# Patient Record
Sex: MALE | Race: WHITE
[De-identification: names, ages, dates, MRNs, and addresses within clinical notes are randomized per-mention and may not be internally consistent; named-entity substitution may affect disease eponyms.]

---

## 2019-07-29 ENCOUNTER — HOSPITAL ENCOUNTER (INPATIENT)
Dept: HOSPITAL 53 - M ED | Age: 39
LOS: 2 days | Discharge: HOME | DRG: 753 | End: 2019-07-31
Attending: PSYCHIATRY & NEUROLOGY | Admitting: PSYCHIATRY & NEUROLOGY
Payer: COMMERCIAL

## 2019-07-29 VITALS — HEIGHT: 71 IN | BODY MASS INDEX: 23.27 KG/M2 | WEIGHT: 166.23 LBS

## 2019-07-29 VITALS — DIASTOLIC BLOOD PRESSURE: 71 MMHG | SYSTOLIC BLOOD PRESSURE: 114 MMHG

## 2019-07-29 DIAGNOSIS — F12.20: ICD-10-CM

## 2019-07-29 DIAGNOSIS — F14.23: ICD-10-CM

## 2019-07-29 DIAGNOSIS — I10: ICD-10-CM

## 2019-07-29 DIAGNOSIS — Z88.0: ICD-10-CM

## 2019-07-29 DIAGNOSIS — F15.20: ICD-10-CM

## 2019-07-29 DIAGNOSIS — F11.11: ICD-10-CM

## 2019-07-29 DIAGNOSIS — F60.3: ICD-10-CM

## 2019-07-29 DIAGNOSIS — F10.20: ICD-10-CM

## 2019-07-29 DIAGNOSIS — F17.210: ICD-10-CM

## 2019-07-29 DIAGNOSIS — F31.9: Primary | ICD-10-CM

## 2019-07-29 DIAGNOSIS — K52.9: ICD-10-CM

## 2019-07-29 DIAGNOSIS — Z62.810: ICD-10-CM

## 2019-07-29 LAB
ALBUMIN SERPL BCG-MCNC: 4.1 GM/DL (ref 3.2–5.2)
ALT SERPL W P-5'-P-CCNC: 42 U/L (ref 12–78)
AMPHETAMINES UR QL SCN: NEGATIVE
APAP SERPL-MCNC: < 2 UG/ML (ref 10–30)
BARBITURATES UR QL SCN: NEGATIVE
BENZODIAZ UR QL SCN: NEGATIVE
BILIRUB CONJ SERPL-MCNC: 0.2 MG/DL (ref 0–0.2)
BILIRUB SERPL-MCNC: 0.5 MG/DL (ref 0.2–1)
BUN SERPL-MCNC: 7 MG/DL (ref 7–18)
BZE UR QL SCN: POSITIVE
CALCIUM SERPL-MCNC: 9.2 MG/DL (ref 8.5–10.1)
CANNABINOIDS UR QL SCN: NEGATIVE
CHLORIDE SERPL-SCNC: 105 MEQ/L (ref 98–107)
CO2 SERPL-SCNC: 29 MEQ/L (ref 21–32)
CREAT SERPL-MCNC: 1.14 MG/DL (ref 0.7–1.3)
ETHANOL SERPL-MCNC: 0 % (ref 0–0.01)
GFR SERPL CREATININE-BSD FRML MDRD: > 60 ML/MIN/{1.73_M2} (ref 60–?)
GLUCOSE SERPL-MCNC: 79 MG/DL (ref 70–100)
HCT VFR BLD AUTO: 47.7 % (ref 42–52)
HGB BLD-MCNC: 16.3 G/DL (ref 13.5–17.5)
MCH RBC QN AUTO: 31.5 PG (ref 27–33)
MCHC RBC AUTO-ENTMCNC: 34.2 G/DL (ref 32–36.5)
MCV RBC AUTO: 92.1 FL (ref 80–96)
METHADONE UR QL SCN: NEGATIVE
OPIATES UR QL SCN: NEGATIVE
PCP UR QL SCN: NEGATIVE
PLATELET # BLD AUTO: 218 10^3/UL (ref 150–450)
POTASSIUM SERPL-SCNC: 3.9 MEQ/L (ref 3.5–5.1)
PROT SERPL-MCNC: 8.6 GM/DL (ref 6.4–8.2)
RBC # BLD AUTO: 5.18 10^6/UL (ref 4.3–6.1)
SALICYLATES SERPL-MCNC: 4.5 MG/DL (ref 5–30)
SODIUM SERPL-SCNC: 140 MEQ/L (ref 136–145)
TSH SERPL DL<=0.005 MIU/L-ACNC: 2.05 UIU/ML (ref 0.36–3.74)
WBC # BLD AUTO: 6.3 10^3/UL (ref 4–10)

## 2019-07-30 VITALS — DIASTOLIC BLOOD PRESSURE: 68 MMHG | SYSTOLIC BLOOD PRESSURE: 125 MMHG

## 2019-07-30 VITALS — SYSTOLIC BLOOD PRESSURE: 119 MMHG | DIASTOLIC BLOOD PRESSURE: 71 MMHG

## 2019-07-30 VITALS — SYSTOLIC BLOOD PRESSURE: 114 MMHG | DIASTOLIC BLOOD PRESSURE: 65 MMHG

## 2019-07-30 RX ADMIN — MULTIPLE VITAMINS W/ MINERALS TAB SCH TAB: TAB at 11:09

## 2019-07-30 RX ADMIN — DOCUSATE SODIUM SCH MG: 100 CAPSULE, LIQUID FILLED ORAL at 17:00

## 2019-07-30 RX ADMIN — FOLIC ACID SCH MG: 1 TABLET ORAL at 11:09

## 2019-07-30 RX ADMIN — POLYETHYLENE GLYCOL 3350 SCH PKT: 17 POWDER, FOR SOLUTION ORAL at 17:00

## 2019-07-30 RX ADMIN — Medication SCH MG: at 22:02

## 2019-07-30 RX ADMIN — Medication SCH MG: at 11:09

## 2019-07-30 RX ADMIN — DOCUSATE SODIUM SCH MG: 100 CAPSULE, LIQUID FILLED ORAL at 21:00

## 2019-07-30 NOTE — HPEPDOC
General


Date of Admission


Jul 29, 2019 at 14:45


Date of Service:  Jul 30, 2019


Attending Physician:  ZAHRAA JOSE MD


Chief Complaint


The patient is a 38-year-old male admitted with a reason for visit of [facility].





History of Present Illness


Patient is a 38-year-old male, past medical history significant for nicotine 

dependence, anxiety disorder, polysubstance abuse with cocaine, heroin, admitted

on account of suicidal ideation.Review of for admission record states patient 

stated on presentation, he had stopped taking his psych meds about a year ago 

because he was feeling much better. Subsequently he kept getting increasingly 

depressed and thinking about ways to kill himself.


Medical evaluation was positive for diarrhea which he reports he has had for 

about 2 years but cannot remember when he had his last episode. He does also 

have a prior history of hepatitis C and B. HIV testing was offered an admission 

and patient declined.





He denied otherwise any chest pain, shortness of breath, weakness.





Home Medications


No Active Prescriptions or Reported Meds





Allergies


Coded Allergies:  


     Penicillins (Verified  Allergy, Unknown, 7/29/19)





Past Medical History


Medical History


Hypertension


Depression


Anxiety


Nicotine dependence


Hepatitis C and B


Polysubstance abuse with heroine


Surgical History


Right armpit lymph node removal





Family History


Denies any family history.





Social History


Smokes one pack of cigarettes per day, drinks beer or liquor daily, ongoing 

polysubstance abuse with cocaine





A-FIB/CHADSVASC


A-FIB History


Current/History of A-Fib/PAF?:  No


Current PO Anticoag Therapy:  No





Review of Systems


Other systems


A pertinent 10 point review of systems is completed, negative except as stated 

in the history of presenting illness





Physical Examination


Other physical findings


GENERAL:  NAD   


 SKIN : Warm, dry intact      


HEENT:  Atraumatic, normocephalic, PERRL, moist mucous membrane  


CARDIOVASCULAR: Regular rate and rhythm, S1S2, no JVD, no edema, distal pulses +

 palpable     


RESP:  CTAB, no accessory muscle use noted 


ABDOMEN: BS+  distended +tender to suprapubic area   


MS: no joint deformities


NEURO: Alert and oriented x 3, CN2-12 grossly intact            


PSYCH: no anxiety or agitation, appropriate mood and affect.





Vital Signs





Vital Signs








  Date Time  Temp Pulse Resp B/P (MAP) Pulse Ox O2 Delivery O2 Flow Rate FiO2


 


7/30/19 09:00      Room Air  


 


7/30/19 06:49 97.9 79 12 119/71 (87)    


 


7/29/19 15:41     99   











Laboratory Data


Labs 24H


Laboratory Tests 2


7/29/19 10:56: 


Nucleated Red Blood Cells % (auto) 0.0, Anion Gap 6L, Glomerular Filtration Rate

> 60.0, Calcium Level 9.2, Aspartate Amino Transf (AST/SGOT) 38H, Alanine 

Aminotransferase (ALT/SGPT) 42, Alkaline Phosphatase 80, Total Bilirubin 0.5, 

Direct Bilirubin 0.2, Total Protein 8.6H, Albumin 4.1, Albumin/Globulin Ratio 

0.91L, Thyroid Stimulating Hormone (TSH) 2.050, Salicylates Level 4.5L, Urine 

Amphetamines Screen NEGATIVE, Urine Benzodiazepines Screen NEGATIVE, Urine 

Opiates Screen NEGATIVE, Urine Methadone Screen NEGATIVE, Acetaminophen Level < 

2.0L, Urine Barbiturates Screen NEGATIVE, Urine Phencyclidine Screen NEGATIVE, 

Urine Cocaine Metabolite Screen POSITIVEH, Urine Cannabinoids Screen NEGATIVE, 

Ethyl Alcohol Level 0.003


CBC/BMP


Laboratory Tests


7/29/19 10:56








Red Blood Count 5.18, Mean Corpuscular Volume 92.1, Mean Corpuscular Hemoglobin 

31.5, Mean Corpuscular Hemoglobin Concent 34.2, Red Cell Distribution Width 12.7





 Assessment/Plan


Suicidal ideation


Polysubstance abuse with cocaine


Abdominal pain with reported chronic diarrhea


Nicotine dependence


Alcohol abuse





Assessment and plan


Patient is willing for HIV testing at this time


CT abdomen and pelvis to evaluate for causes for abdominal pain


Follow findings of above testing


Evaluation and management of acute psychiatric problems by primary team





Plan / VTE


VTE Prophylaxis Ordered?:  No


VTE Exclusion Mechanical Proph:  Low Risk for VTE











YUNIOR SNYDER [facility]          Jul 30, 2019 10:13

## 2019-07-30 NOTE — REP
Clinical:  Abdominal pain and chronic diarrhea.

 

Technique:  Axial contrast enhanced images from the lung bases to the pubic

symphysis using 100 ml Isovue 370 intravenous contrast material with coronal and

sagittal re-formations.

 

Findings:

Lung bases are clear.  Visualized heart and pericardium normal.

 

Fatty infiltration to the liver noted without focal hepatic lesion.

Cholelithiasis identified without evidence for acute cholecystitis.  Spleen,

pancreas, bilateral adrenal glands and kidneys are normal.  The enteric system is

without obstruction or acute inflammatory process.  Mild/moderate colonic fecal

stasis noted.  Pelvis demonstrates normal bladder and age appropriate

prostate/seminal vesicles.  No ascites.  No free air.  No adenopathy.  Abdominal

aorta and vasculature without aneurysm or dissection.  The skeletal structures

are intact.

 

Impression:

1.  Hepatic steatosis.

2.  Cholelithiasis.

3.  Mild/moderate fecal stasis.

 

 

Electronically Signed by

Lan Freeman MD 07/30/2019 12:03 P

## 2019-07-31 VITALS — SYSTOLIC BLOOD PRESSURE: 117 MMHG | DIASTOLIC BLOOD PRESSURE: 66 MMHG

## 2019-07-31 RX ADMIN — FOLIC ACID SCH MG: 1 TABLET ORAL at 09:04

## 2019-07-31 RX ADMIN — MULTIPLE VITAMINS W/ MINERALS TAB SCH TAB: TAB at 09:04

## 2019-07-31 RX ADMIN — DOCUSATE SODIUM SCH MG: 100 CAPSULE, LIQUID FILLED ORAL at 09:00

## 2019-07-31 RX ADMIN — Medication SCH MG: at 09:04

## 2019-07-31 RX ADMIN — POLYETHYLENE GLYCOL 3350 SCH PKT: 17 POWDER, FOR SOLUTION ORAL at 09:00

## 2019-07-31 NOTE — MHIPNPDOC
[facility] Progress Note


Vital Signs





Vital Signs








  Date Time  Temp Pulse Resp B/P (MAP) Pulse Ox O2 Delivery O2 Flow Rate FiO2


 


7/31/19 06:39 97.8 54 16 117/66 (83)    


 


7/30/19 09:00      Room Air  


 


7/29/19 15:41     99   











Laboratory Data


24H Labs


Laboratory Tests 2


7/30/19 12:10: 





Current Medications





Current Medications








 Medications


  (Trade)  Dose


 Ordered  Sig/Jasbir


 Route


 PRN Reason  Start Time


 Stop Time Status Last Admin


Dose Admin


 


 Acetaminophen


  (Tylenol Tab)  650 mg  Q6HP  PRN


 PO


 HEADACHE or DISCOMFORT  7/29/19 14:45


     





 


 Docusate Sodium


  (Colace)  200 mg  TID


 PO


   7/30/19 16:00


 8/1/19 09:01   





 


 Folic Acid


  (Folic Acid)  1 mg  DAILY


 PO


   7/30/19 09:00


    7/31/19 09:04





 


 Home Med


  (Med Rec


 Complete!)    ASDIRECTED


 XX


   7/29/19 14:30


 7/29/19 14:30 DC  





 


 Lithium Carbonate


  (Lithium


 Carbonate)  300 mg  QHS


 PO


   7/30/19 21:00


    7/30/19 22:02





 


 Lorazepam


  (Ativan)  2 mg  ASDIRECTED  PRN


 PO


 SEE PROTOCOL  7/30/19 10:30


     





 


 Magnesium


 Hydroxide


  (Milk Of


 Magnesia)  30 ml  DAILYPRN  PRN


 PO


 CONSTIPATION  7/29/19 14:45


     





 


 Multivitamins


  (Theragram-M)  1 tab  DAILY


 PO


   7/30/19 09:00


    7/31/19 09:04





 


 Nicotine


  (Nicorette)  2 mg  Q2HP  PRN


 PO


 NICOTINE WITHDRAWAL  7/30/19 11:00


     





 


 Olanzapine


  (ZyPREXA)  5 mg  Q4HP  PRN


 PO


 AGITATION  7/29/19 14:45


     





 


 Polyethylene


 Glycol


  (Miralax)  1 pkt  DAILY


 PO


   7/30/19 15:45


     





 


 Thiamine HCl


  (Thiamine HCl)  100 mg  BID


 PO


   7/30/19 09:00


 8/1/19 21:01  7/31/19 09:04














Allergies


Coded Allergies:  


     Penicillins (Verified  Allergy, Unknown, 7/29/19)











DEYANIRA GARCIA DO              Jul 31, 2019 09:52

## 2019-08-01 NOTE — MHDSPDOC
[facility] Discharge Summary


Discharge Summary


DATE OF ADMISSION: Jul 29, 2019 at 14:45 


DATE OF DISCHARGE: Jul 31, 2019 at 14:30





Date of Service: 07/31/2019





Diagnoses


Unspecified bipolar disorder.


R/O Substance induced





Cannabis use disorder, severe.





Tobacco use disorder, severe.





Opioid use disorder, severe.





Alcohol use disorder, severe.





Cocaine use disorder, severe. 





History of Present Illness


Patient a 38-year-old man with a history of substance use and borderline 

personality disorder, presents to [facility] reporting significant

depression with loss of interest, focus problems, fatigue and suicidal thoughts 

for the last several weeks. He reports that he has recently become active in 

substance use with cocaine, cannabis intermittently and tobacco. He reports 

significant alcohol use as well with several twisted teas daily. He reports 

significant psychosocial stressors in the form of starting a new business with a

friend that he feels is highly stressful due to both interpersonal conflict and 

as well living with his current . He reports that the substance 

use has not improved his situation and that he continues to worsen as an 

outpatient. He has been off medication for roughly one year and has not been 

followed by psychiatry.





Consultants Involved


Hospitalist/PCP screening





Treatment and Progress On The Unit


The patient was admitted to the inpatient unit and subsequently started on a 

alcohol withdrawal protocol. He was met with but he was known to be fairly tired

likely secondary to his cocaine withdrawal. The patient subsequently resolved 

quite quickly suggesting a substance induced condition far more likely. He 

reports symptoms that could be consistent with bipolar disorder and after great 

discussion with the patient and weighing of risks and benefits with the patient,

lithium was elected and tried 300 milligrams nightly of which the patient portal

he tolerated well. He stated that he wished to leave and he had not been stating

any suicidal thoughts for 24 hours prior to his discharge and thus was judged 

not to meet involuntary criteria as he was attending to his needs and not 

demonstrating any concerning behavior. He elected against the further inpatient 

admission for titration of his medications and was subsequently discharged. His 

alcohol withdrawal scores continue to be below threshold during his entire 

admission suggesting no significant withdrawal from alcohol which is consistent 

with patient report of no hx of alcohol withdrawl symptoms in the past. 





Discharge Assessment


The patient at 38 year old man with a significant history of substance abuse and

possible bipolar disorder presents in severe depression likely related or 

provoked by cocaine withdrawal, which is generally consistent with this 

presentation. He was started on low-dose lithium as he would likely do well on 

it as collateral supports likely indicate that he has some form of bipolar. 

However, given his large amounts of substance use it's difficult to make a 

primary specific diagnosis without a significant amount of sober time. His 

lithium was low as expected but will need to be followed up if patient elects to

continue with this line of treatment.





Mental Status Examination


General: Well dressed with good hygiene





Speech: Spontaneous and fluid





Thought processes: Linear and logical





MSK: Smooth and coordinated gait, no signs of tremors or involuntary orofacial 

movements





Thought content: Future orientated





Abstract reasoning, and computation: Intact





Description of associations: Intact





Description of abnormal or psychotic thoughts: Denies any suicidal or homicidal 

ideation. Denies any auditory or visual hallucinations. Does not appear to be 

responding to internal stimuli. Does not appear to be endorsing any bizarre or 

paranoid ideation.





Judgment: fair





Insight: fair





Orientation: Alert and orientated 3





Cognition: Grossly normal





Recent and remote memory: Intact





Attention span and concentration: Intact





Fund of knowledge: Adequate





Mood: "okay"





Affect: Euthymic with a full range





Follow Up








The social work team worked during the predischarge meeting in order to evaluate

for further issues of lethality address them fully before discharge. They worked

on safety planning with the patient's family members in order to ensure that the

patient will have a safe and effective discharge.





Time Spent


The amount of time spent in the coordination of care for this patient was 

approximately 30 minutes.





Wednesday





Vital Signs/I&Os





Vital Signs








  Date Time  Temp Pulse Resp B/P (MAP) Pulse Ox O2 Delivery O2 Flow Rate FiO2


 


7/31/19 12:40      Room Air  


 


7/31/19 06:39 97.8 54 16 117/66 (83)    


 


7/29/19 15:41     99   











Laboratory Data


Labs 24H


Laboratory Tests 2


7/31/19 11:45: Lithium Level < 0.20L





Medications


Scheduled


Lithium Carbonate (Lithium Carbonate) 300 Mg Capsule, 300 MG PO QHS for mood for

7 Days, #7





Scheduled PRN


Nicotine Polacrilex (Nicotine Gum) 2 Mg Gum, 2 MG PO Q2HP PRN for NICOTINE 

WITHDRAWAL for 30 Days, #1





Allergies


Coded Allergies:  


     Penicillins (Verified  Allergy, Unknown, 7/29/19)











DEYANIRA GARCIA DO               Aug 1, 2019 08:17

## 2019-08-02 LAB
HIV1 RNA # SERPL NAA+PROBE: <20 COPIES/ML
HIV1 RNA SERPL NAA+PROBE-LOG#: (no result) {LOG_COPIES}/ML

## 2019-08-08 ENCOUNTER — HOSPITAL ENCOUNTER (OUTPATIENT)
Dept: HOSPITAL 53 - M SFHCPLAZ | Age: 39
End: 2019-08-08
Attending: NURSE PRACTITIONER
Payer: COMMERCIAL

## 2019-08-08 DIAGNOSIS — B19.20: Primary | ICD-10-CM

## 2019-08-08 LAB
ALBUMIN SERPL BCG-MCNC: 4 GM/DL (ref 3.2–5.2)
ALT SERPL W P-5'-P-CCNC: 40 U/L (ref 12–78)
BILIRUB SERPL-MCNC: 0.3 MG/DL (ref 0.2–1)
BUN SERPL-MCNC: 6 MG/DL (ref 7–18)
CALCIUM SERPL-MCNC: 9 MG/DL (ref 8.5–10.1)
CHLORIDE SERPL-SCNC: 106 MEQ/L (ref 98–107)
CO2 SERPL-SCNC: 27 MEQ/L (ref 21–32)
CREAT SERPL-MCNC: 1.08 MG/DL (ref 0.7–1.3)
GFR SERPL CREATININE-BSD FRML MDRD: > 60 ML/MIN/{1.73_M2} (ref 60–?)
GLUCOSE SERPL-MCNC: 75 MG/DL (ref 70–100)
HCT VFR BLD AUTO: 46.4 % (ref 42–52)
HGB BLD-MCNC: 16 G/DL (ref 13.5–17.5)
MCH RBC QN AUTO: 31.4 PG (ref 27–33)
MCHC RBC AUTO-ENTMCNC: 34.5 G/DL (ref 32–36.5)
MCV RBC AUTO: 91 FL (ref 80–96)
PLATELET # BLD AUTO: 199 10^3/UL (ref 150–450)
POTASSIUM SERPL-SCNC: 4.2 MEQ/L (ref 3.5–5.1)
PROT SERPL-MCNC: 8.1 GM/DL (ref 6.4–8.2)
RBC # BLD AUTO: 5.1 10^6/UL (ref 4.3–6.1)
SODIUM SERPL-SCNC: 139 MEQ/L (ref 136–145)
WBC # BLD AUTO: 7.3 10^3/UL (ref 4–10)

## 2019-08-09 LAB
HBV SURFACE AB SER QL: POSITIVE
HBV SURFACE AB SER-ACNC: NEGATIVE M[IU]/ML
HIV 1+2 AB+HIV1 P24 AG SERPL QL IA: NEGATIVE

## 2019-08-13 LAB
HCV GENTYP SERPL NAA+PROBE: (no result)
HCV RNA SERPL NAA+PROBE-ACNC: (no result) IU/ML

## 2019-09-11 ENCOUNTER — HOSPITAL ENCOUNTER (EMERGENCY)
Dept: HOSPITAL 53 - M ED | Age: 39
LOS: 1 days | Discharge: PSYCHIATRIC OR SUBSTANCE USE FACILITY | End: 2019-09-12
Payer: COMMERCIAL

## 2019-09-11 VITALS — WEIGHT: 183.38 LBS | BODY MASS INDEX: 25.67 KG/M2 | HEIGHT: 71 IN

## 2019-09-11 DIAGNOSIS — I10: ICD-10-CM

## 2019-09-11 DIAGNOSIS — Z88.0: ICD-10-CM

## 2019-09-11 DIAGNOSIS — F19.10: ICD-10-CM

## 2019-09-11 DIAGNOSIS — F31.9: ICD-10-CM

## 2019-09-11 DIAGNOSIS — F17.200: ICD-10-CM

## 2019-09-11 DIAGNOSIS — B19.20: ICD-10-CM

## 2019-09-11 DIAGNOSIS — R00.0: ICD-10-CM

## 2019-09-11 DIAGNOSIS — Z79.899: ICD-10-CM

## 2019-09-11 DIAGNOSIS — F60.3: ICD-10-CM

## 2019-09-11 DIAGNOSIS — Z91.14: ICD-10-CM

## 2019-09-11 DIAGNOSIS — T43.592A: Primary | ICD-10-CM

## 2019-09-11 LAB
ALBUMIN SERPL BCG-MCNC: 4.3 GM/DL (ref 3.2–5.2)
ALT SERPL W P-5'-P-CCNC: 43 U/L (ref 12–78)
AMPHETAMINES UR QL SCN: NEGATIVE
APAP SERPL-MCNC: < 2 UG/ML (ref 10–30)
BARBITURATES UR QL SCN: NEGATIVE
BASOPHILS # BLD AUTO: 0 10^3/UL (ref 0–0.2)
BASOPHILS NFR BLD AUTO: 0.5 % (ref 0–1)
BENZODIAZ UR QL SCN: NEGATIVE
BILIRUB CONJ SERPL-MCNC: 0.1 MG/DL (ref 0–0.2)
BILIRUB SERPL-MCNC: 0.4 MG/DL (ref 0.2–1)
BUN SERPL-MCNC: 7 MG/DL (ref 7–18)
BZE UR QL SCN: POSITIVE
CALCIUM SERPL-MCNC: 9.7 MG/DL (ref 8.5–10.1)
CANNABINOIDS UR QL SCN: NEGATIVE
CHLORIDE SERPL-SCNC: 105 MEQ/L (ref 98–107)
CK SERPL-CCNC: 212 U/L (ref 39–308)
CO2 SERPL-SCNC: 23 MEQ/L (ref 21–32)
CREAT SERPL-MCNC: 1.19 MG/DL (ref 0.7–1.3)
EOSINOPHIL # BLD AUTO: 0.2 10^3/UL (ref 0–0.5)
EOSINOPHIL NFR BLD AUTO: 1.9 % (ref 0–3)
ETHANOL SERPL-MCNC: 0.13 % (ref 0–0.01)
GFR SERPL CREATININE-BSD FRML MDRD: > 60 ML/MIN/{1.73_M2} (ref 60–?)
GLUCOSE SERPL-MCNC: 74 MG/DL (ref 70–100)
HCT VFR BLD AUTO: 46.6 % (ref 42–52)
HGB BLD-MCNC: 16.5 G/DL (ref 13.5–17.5)
LITHIUM SERPL-SCNC: 2.42 MEQ/L (ref 0.6–1.2)
LYMPHOCYTES # BLD AUTO: 2.2 10^3/UL (ref 1.5–5)
LYMPHOCYTES NFR BLD AUTO: 27.1 % (ref 24–44)
MCH RBC QN AUTO: 32 PG (ref 27–33)
MCHC RBC AUTO-ENTMCNC: 35.4 G/DL (ref 32–36.5)
MCV RBC AUTO: 90.3 FL (ref 80–96)
METHADONE UR QL SCN: NEGATIVE
MONOCYTES # BLD AUTO: 0.6 10^3/UL (ref 0–0.8)
MONOCYTES NFR BLD AUTO: 7.7 % (ref 0–5)
NEUTROPHILS # BLD AUTO: 5 10^3/UL (ref 1.5–8.5)
NEUTROPHILS NFR BLD AUTO: 62.5 % (ref 36–66)
OPIATES UR QL SCN: NEGATIVE
PCP UR QL SCN: NEGATIVE
PLATELET # BLD AUTO: 279 10^3/UL (ref 150–450)
POTASSIUM SERPL-SCNC: 4 MEQ/L (ref 3.5–5.1)
PROT SERPL-MCNC: 8.6 GM/DL (ref 6.4–8.2)
RBC # BLD AUTO: 5.16 10^6/UL (ref 4.3–6.1)
SALICYLATES SERPL-MCNC: 4.1 MG/DL (ref 5–30)
SODIUM SERPL-SCNC: 137 MEQ/L (ref 136–145)
TSH SERPL DL<=0.005 MIU/L-ACNC: 0.98 UIU/ML (ref 0.36–3.74)
WBC # BLD AUTO: 8 10^3/UL (ref 4–10)

## 2019-09-11 PROCEDURE — 36415 COLL VENOUS BLD VENIPUNCTURE: CPT

## 2019-09-11 PROCEDURE — 96360 HYDRATION IV INFUSION INIT: CPT

## 2019-09-11 PROCEDURE — 93005 ELECTROCARDIOGRAM TRACING: CPT

## 2019-09-11 PROCEDURE — 80048 BASIC METABOLIC PNL TOTAL CA: CPT

## 2019-09-11 PROCEDURE — 80307 DRUG TEST PRSMV CHEM ANLYZR: CPT

## 2019-09-11 PROCEDURE — 82550 ASSAY OF CK (CPK): CPT

## 2019-09-11 PROCEDURE — 84443 ASSAY THYROID STIM HORMONE: CPT

## 2019-09-11 PROCEDURE — 99285 EMERGENCY DEPT VISIT HI MDM: CPT

## 2019-09-11 PROCEDURE — 93041 RHYTHM ECG TRACING: CPT

## 2019-09-11 PROCEDURE — 85025 COMPLETE CBC W/AUTO DIFF WBC: CPT

## 2019-09-11 PROCEDURE — 94760 N-INVAS EAR/PLS OXIMETRY 1: CPT

## 2019-09-11 PROCEDURE — 80178 ASSAY OF LITHIUM: CPT

## 2019-09-11 PROCEDURE — 80076 HEPATIC FUNCTION PANEL: CPT

## 2019-09-12 VITALS — DIASTOLIC BLOOD PRESSURE: 79 MMHG | SYSTOLIC BLOOD PRESSURE: 134 MMHG

## 2019-09-12 LAB
BUN SERPL-MCNC: 12 MG/DL (ref 7–18)
CALCIUM SERPL-MCNC: 9.1 MG/DL (ref 8.5–10.1)
CHLORIDE SERPL-SCNC: 108 MEQ/L (ref 98–107)
CO2 SERPL-SCNC: 24 MEQ/L (ref 21–32)
CREAT SERPL-MCNC: 1.1 MG/DL (ref 0.7–1.3)
GFR SERPL CREATININE-BSD FRML MDRD: > 60 ML/MIN/{1.73_M2} (ref 60–?)
GLUCOSE SERPL-MCNC: 109 MG/DL (ref 70–100)
LITHIUM SERPL-SCNC: 1.04 MEQ/L (ref 0.6–1.2)
POTASSIUM SERPL-SCNC: 3.8 MEQ/L (ref 3.5–5.1)
SODIUM SERPL-SCNC: 139 MEQ/L (ref 136–145)

## 2019-09-12 NOTE — ECGEPIP
[facility] - ED

                                       

                                       Test Date:    2019

Pat Name:     SHAUN ABAD            Department:   

Patient ID:   [id number]                 Room:         -

Gender:       Male                     Technician:   

:          1980               Requested By: ROB PACHECO

Order Number: [id number]     Reading MD:   Idalia Fernandez

                                 Measurements

Intervals                              Axis          

Rate:         113                      P:            61

HI:           130                      QRS:          86

QRSD:         93                       T:            45

QT:           324                                    

QTc:          445                                    

                           Interpretive Statements

SINUS TACHYCARDIA

ABNORMAL RHYTHM ECG

No prior

Electronically Signed on 2019 7:21:56 EDT by Idalia Fernandez

## 2019-10-05 ENCOUNTER — HOSPITAL ENCOUNTER (EMERGENCY)
Dept: HOSPITAL 53 - M ED | Age: 39
LOS: 1 days | Discharge: HOME | End: 2019-10-06
Payer: COMMERCIAL

## 2019-10-05 VITALS — WEIGHT: 180.38 LBS | BODY MASS INDEX: 25.25 KG/M2 | HEIGHT: 71 IN

## 2019-10-05 DIAGNOSIS — F10.159: Primary | ICD-10-CM

## 2019-10-05 DIAGNOSIS — Z88.0: ICD-10-CM

## 2019-10-05 PROCEDURE — 80048 BASIC METABOLIC PNL TOTAL CA: CPT

## 2019-10-05 PROCEDURE — 80076 HEPATIC FUNCTION PANEL: CPT

## 2019-10-05 PROCEDURE — 99284 EMERGENCY DEPT VISIT MOD MDM: CPT

## 2019-10-05 PROCEDURE — 80307 DRUG TEST PRSMV CHEM ANLYZR: CPT

## 2019-10-05 PROCEDURE — 81001 URINALYSIS AUTO W/SCOPE: CPT

## 2019-10-05 PROCEDURE — 85027 COMPLETE CBC AUTOMATED: CPT

## 2019-10-05 PROCEDURE — 84443 ASSAY THYROID STIM HORMONE: CPT

## 2019-10-06 VITALS — DIASTOLIC BLOOD PRESSURE: 96 MMHG | SYSTOLIC BLOOD PRESSURE: 142 MMHG

## 2019-10-06 LAB
ALBUMIN SERPL BCG-MCNC: 4.1 GM/DL (ref 3.2–5.2)
ALT SERPL W P-5'-P-CCNC: 54 U/L (ref 12–78)
AMPHETAMINES UR QL SCN: NEGATIVE
APAP SERPL-MCNC: < 2 UG/ML (ref 10–30)
APPEARANCE UR: CLEAR
BACTERIA UR QL AUTO: NEGATIVE
BARBITURATES UR QL SCN: NEGATIVE
BENZODIAZ UR QL SCN: NEGATIVE
BILIRUB CONJ SERPL-MCNC: 0.1 MG/DL (ref 0–0.2)
BILIRUB SERPL-MCNC: 0.3 MG/DL (ref 0.2–1)
BILIRUB UR QL STRIP.AUTO: NEGATIVE
BUN SERPL-MCNC: 6 MG/DL (ref 7–18)
BZE UR QL SCN: NEGATIVE
CALCIUM SERPL-MCNC: 8.7 MG/DL (ref 8.5–10.1)
CANNABINOIDS UR QL SCN: NEGATIVE
CHLORIDE SERPL-SCNC: 109 MEQ/L (ref 98–107)
CO2 SERPL-SCNC: 25 MEQ/L (ref 21–32)
CREAT SERPL-MCNC: 0.96 MG/DL (ref 0.7–1.3)
ETHANOL SERPL-MCNC: 0.22 % (ref 0–0.01)
GFR SERPL CREATININE-BSD FRML MDRD: > 60 ML/MIN/{1.73_M2} (ref 60–?)
GLUCOSE SERPL-MCNC: 88 MG/DL (ref 70–100)
GLUCOSE UR QL STRIP.AUTO: NEGATIVE MG/DL
HCT VFR BLD AUTO: 45.1 % (ref 42–52)
HGB BLD-MCNC: 15.7 G/DL (ref 13.5–17.5)
HGB UR QL STRIP.AUTO: (no result)
KETONES UR QL STRIP.AUTO: NEGATIVE MG/DL
LEUKOCYTE ESTERASE UR QL STRIP.AUTO: NEGATIVE
MCH RBC QN AUTO: 31.8 PG (ref 27–33)
MCHC RBC AUTO-ENTMCNC: 34.8 G/DL (ref 32–36.5)
MCV RBC AUTO: 91.3 FL (ref 80–96)
METHADONE UR QL SCN: NEGATIVE
NITRITE UR QL STRIP.AUTO: NEGATIVE
OPIATES UR QL SCN: NEGATIVE
PCP UR QL SCN: NEGATIVE
PH UR STRIP.AUTO: 8 UNITS (ref 5–9)
PLATELET # BLD AUTO: 209 10^3/UL (ref 150–450)
POTASSIUM SERPL-SCNC: 4 MEQ/L (ref 3.5–5.1)
PROT SERPL-MCNC: 8.4 GM/DL (ref 6.4–8.2)
PROT UR QL STRIP.AUTO: NEGATIVE MG/DL
RBC # BLD AUTO: 4.94 10^6/UL (ref 4.3–6.1)
RBC # UR AUTO: 2 /HPF (ref 0–3)
SALICYLATES SERPL-MCNC: 3.8 MG/DL (ref 5–30)
SODIUM SERPL-SCNC: 142 MEQ/L (ref 136–145)
SP GR UR STRIP.AUTO: 1 (ref 1–1.03)
SQUAMOUS #/AREA URNS AUTO: 0 /HPF (ref 0–6)
TSH SERPL DL<=0.005 MIU/L-ACNC: 2.43 UIU/ML (ref 0.36–3.74)
UROBILINOGEN UR QL STRIP.AUTO: 0.2 MG/DL (ref 0–2)
WBC # BLD AUTO: 7.1 10^3/UL (ref 4–10)
WBC #/AREA URNS AUTO: 0 /HPF (ref 0–3)